# Patient Record
Sex: MALE | Race: WHITE | NOT HISPANIC OR LATINO | Employment: FULL TIME | URBAN - METROPOLITAN AREA
[De-identification: names, ages, dates, MRNs, and addresses within clinical notes are randomized per-mention and may not be internally consistent; named-entity substitution may affect disease eponyms.]

---

## 2022-07-21 ENCOUNTER — OFFICE VISIT (OUTPATIENT)
Dept: UROLOGY | Facility: CLINIC | Age: 42
End: 2022-07-21
Payer: MEDICAID

## 2022-07-21 VITALS — DIASTOLIC BLOOD PRESSURE: 80 MMHG | SYSTOLIC BLOOD PRESSURE: 165 MMHG | HEART RATE: 93 BPM | TEMPERATURE: 99 F

## 2022-07-21 DIAGNOSIS — N43.3 HYDROCELE IN ADULT: Primary | ICD-10-CM

## 2022-07-21 PROCEDURE — 99204 PR OFFICE/OUTPT VISIT, NEW, LEVL IV, 45-59 MIN: ICD-10-PCS | Mod: S$GLB,,, | Performed by: UROLOGY

## 2022-07-21 PROCEDURE — 3077F SYST BP >= 140 MM HG: CPT | Mod: CPTII,S$GLB,, | Performed by: UROLOGY

## 2022-07-21 PROCEDURE — 1160F PR REVIEW ALL MEDS BY PRESCRIBER/CLIN PHARMACIST DOCUMENTED: ICD-10-PCS | Mod: CPTII,S$GLB,, | Performed by: UROLOGY

## 2022-07-21 PROCEDURE — 3077F PR MOST RECENT SYSTOLIC BLOOD PRESSURE >= 140 MM HG: ICD-10-PCS | Mod: CPTII,S$GLB,, | Performed by: UROLOGY

## 2022-07-21 PROCEDURE — 99204 OFFICE O/P NEW MOD 45 MIN: CPT | Mod: S$GLB,,, | Performed by: UROLOGY

## 2022-07-21 PROCEDURE — 1159F MED LIST DOCD IN RCRD: CPT | Mod: CPTII,S$GLB,, | Performed by: UROLOGY

## 2022-07-21 PROCEDURE — 3079F DIAST BP 80-89 MM HG: CPT | Mod: CPTII,S$GLB,, | Performed by: UROLOGY

## 2022-07-21 PROCEDURE — 3079F PR MOST RECENT DIASTOLIC BLOOD PRESSURE 80-89 MM HG: ICD-10-PCS | Mod: CPTII,S$GLB,, | Performed by: UROLOGY

## 2022-07-21 PROCEDURE — 1159F PR MEDICATION LIST DOCUMENTED IN MEDICAL RECORD: ICD-10-PCS | Mod: CPTII,S$GLB,, | Performed by: UROLOGY

## 2022-07-21 PROCEDURE — 1160F RVW MEDS BY RX/DR IN RCRD: CPT | Mod: CPTII,S$GLB,, | Performed by: UROLOGY

## 2022-07-21 RX ORDER — MELOXICAM 15 MG/1
TABLET ORAL
COMMUNITY

## 2022-07-21 RX ORDER — TIZANIDINE 4 MG/1
TABLET ORAL
COMMUNITY
Start: 2022-07-14

## 2022-07-21 RX ORDER — MODAFINIL 200 MG/1
TABLET ORAL
COMMUNITY
Start: 2022-07-05

## 2022-07-21 RX ORDER — GABAPENTIN 300 MG/1
CAPSULE ORAL
COMMUNITY
Start: 2022-07-14

## 2022-07-21 NOTE — PROGRESS NOTES
Subjective:       Patient ID: Angel Keyes is a 41 y.o. male.    Chief Complaint: Other (Right enlarged testicle-painful)      HPI:  41-year-old male with an enlarging right scrotum he was told that it was a hydrocele he states that he has had an ultrasound however do not have an ultrasound to review he is here for management    Past Medical History:   Past Medical History:   Diagnosis Date    Coronary artery disease     Hyperlipidemia     Hypertension     Myocardial infarction     Status post partial colectomy 2018       Past Surgical Historical:   Past Surgical History:   Procedure Laterality Date    COLON SURGERY  2018        Medications:   Medication List with Changes/Refills   Current Medications    AMLODIPINE (NORVASC) 10 MG TABLET        ASPIRIN (ASPIR-LOW) 81 MG EC TABLET    Aspir-Low 81 mg tablet,delayed release   TAKE 1 TABLET ORALLY EVERY DAY THANK YOU    ATORVASTATIN (LIPITOR) 80 MG TABLET        CLOPIDOGREL (PLAVIX) 75 MG TABLET        EZETIMIBE (ZETIA) 10 MG TABLET        FLU VACC [id number] 6MOS UP,PF, (FLUZONE QUAD [id number], PF,) 60 MCG (15 MCG X 4)/0.5 ML SYRG    Fluzone Quad [id number] (PF) 60 mcg (15 mcg x 4)/0.5 mL IM syringe   flu vaccine    GABAPENTIN (NEURONTIN) 300 MG CAPSULE    TAKE 1 CAPSULE ORALLY NIGHTLY AT BEDTIME THANK YOU    MAG HYDROX/ALUMINUM HYD/SIMETH (ANTACID ORAL)    Take by mouth.    MELOXICAM (MOBIC) 15 MG TABLET    meloxicam 15 mg tablet   TAKE 1 TABLET ORALLY EVERY DAY. THANK YOU    METOPROLOL TARTRATE (LOPRESSOR) 25 MG TABLET        MODAFINIL (PROVIGIL) 200 MG TAB    TAKE 1 TABLET ORALLY EVERY MORNING THANK YOU    MULTIVIT-MIN/FOLIC/VIT K/LYCOP (MEN'S MULTIVITAMIN ORAL)    Take by mouth.    PANTOPRAZOLE (PROTONIX) 40 MG TABLET        TIZANIDINE (ZANAFLEX) 4 MG TABLET    TAKE 1 TABLET ORALLY NIGHTLY AT BEDTIME THANK YOU        Past Social History:   Social History     Socioeconomic History    Marital status:    Tobacco Use    Smoking status: Former Smoker      Types: Cigarettes     Quit date: 6/1/2019     Years since quitting: 3.1    Smokeless tobacco: Current User     Types: Snuff   Substance and Sexual Activity    Alcohol use: Not Currently    Drug use: Never   Social History Narrative    ** Merged History Encounter **            Allergies: Review of patient's allergies indicates:  No Known Allergies     Family History: History reviewed. No pertinent family history.     Review of Systems:  Review of Systems   Constitutional: Negative for activity change and appetite change.   HENT: Negative for congestion and dental problem.    Eyes: Negative for visual disturbance.   Respiratory: Negative for chest tightness and shortness of breath.    Cardiovascular: Negative for chest pain.   Gastrointestinal: Negative for abdominal distention and abdominal pain.   Genitourinary: Negative for decreased urine volume, difficulty urinating, dysuria, enuresis, flank pain, frequency, genital sores, hematuria, penile discharge, penile pain, penile swelling, scrotal swelling, testicular pain and urgency.   Musculoskeletal: Negative for back pain and neck pain.   Skin: Negative for color change.   Neurological: Negative for dizziness.   Hematological: Negative for adenopathy.   Psychiatric/Behavioral: Negative for agitation, behavioral problems and confusion.       Physical Exam:  Physical Exam  Constitutional:       General: He is not in acute distress.     Appearance: He is well-developed.   HENT:      Head: Normocephalic and atraumatic.      Nose: Nose normal.   Eyes:      General: No scleral icterus.     Conjunctiva/sclera: Conjunctivae normal.      Pupils: Pupils are equal, round, and reactive to light.   Neck:      Thyroid: No thyromegaly.      Trachea: No tracheal deviation.   Cardiovascular:      Rate and Rhythm: Normal rate and regular rhythm.      Heart sounds: Normal heart sounds.   Pulmonary:      Effort: Pulmonary effort is normal. No respiratory distress.      Breath sounds:  Normal breath sounds. No wheezing or rales.   Abdominal:      General: Bowel sounds are normal. There is no distension.      Palpations: Abdomen is soft.      Tenderness: There is no abdominal tenderness. There is no guarding or rebound.   Genitourinary:     Penis: Normal. No tenderness.       Prostate: Normal.      Comments: Patient does have a right-sided enlarged scrotum on exam it feels like a hydrocele I am not able to palpate the testicle itself due to the fluid in the scrotum  Musculoskeletal:         General: No deformity. Normal range of motion.      Cervical back: Neck supple.   Lymphadenopathy:      Cervical: No cervical adenopathy.   Skin:     General: Skin is warm and dry.      Findings: No erythema or rash.   Neurological:      Mental Status: He is alert and oriented to person, place, and time.      Cranial Nerves: No cranial nerve deficit.   Psychiatric:         Behavior: Behavior normal.         Assessment/Plan:       Problem List Items Addressed This Visit    None     Visit Diagnoses     Hydrocele in adult    -  Primary              suspected right-sided hydrocele patient states that he had an ultrasound we will need to get these images if he does not have an ultrasound available we will need to repeat ultrasound and we will likely then proceed with  a right-sided hydrocelectomy

## 2022-07-27 ENCOUNTER — TELEPHONE (OUTPATIENT)
Dept: UROLOGY | Facility: CLINIC | Age: 42
End: 2022-07-27
Payer: MEDICAID

## 2022-07-27 DIAGNOSIS — N43.3 HYDROCELE IN ADULT: Primary | ICD-10-CM

## 2022-07-27 DIAGNOSIS — N43.3 RIGHT HYDROCELE: ICD-10-CM

## 2022-07-27 NOTE — TELEPHONE ENCOUNTER
----- Message from Rober Guillen MD sent at 7/27/2022  2:30 PM CDT -----  Please set up for r-hydrocelectomy

## 2022-08-01 ENCOUNTER — CLINICAL SUPPORT (OUTPATIENT)
Dept: UROLOGY | Facility: CLINIC | Age: 42
End: 2022-08-01
Payer: MEDICAID

## 2022-08-01 DIAGNOSIS — N43.3 HYDROCELE IN ADULT: Primary | ICD-10-CM

## 2022-08-01 LAB
ANION GAP SERPL CALC-SCNC: 5 MMOL/L (ref 3–11)
APPEARANCE, UA: ABNORMAL
BASOPHILS NFR BLD: 0.7 % (ref 0–3)
BILIRUB UR QL STRIP: NEGATIVE MG/DL
BUN SERPL-MCNC: 15 MG/DL (ref 7–18)
BUN/CREAT SERPL: 18.98 RATIO (ref 7–18)
CALCIUM SERPL-MCNC: 9.4 MG/DL (ref 8.8–10.5)
CHLORIDE SERPL-SCNC: 104 MMOL/L (ref 100–108)
CO2 SERPL-SCNC: 30 MMOL/L (ref 21–32)
COLOR UR: ABNORMAL
CREAT SERPL-MCNC: 0.79 MG/DL (ref 0.7–1.3)
EOSINOPHIL NFR BLD: 1.8 % (ref 1–3)
ERYTHROCYTE [DISTWIDTH] IN BLOOD BY AUTOMATED COUNT: 13.6 % (ref 12.5–18)
GFR ESTIMATION: > 60
GLUCOSE (UA): NORMAL MG/DL
GLUCOSE SERPL-MCNC: 111 MG/DL (ref 70–110)
HCT VFR BLD AUTO: 43.6 % (ref 42–52)
HGB BLD-MCNC: 14.7 G/DL (ref 14–18)
HGB UR QL STRIP: NEGATIVE /UL
KETONES UR QL STRIP: NEGATIVE MG/DL
LEUKOCYTE ESTERASE UR QL STRIP: NEGATIVE /UL
LYMPHOCYTES NFR BLD: 21.6 % (ref 25–40)
MCH RBC QN AUTO: 28.3 PG (ref 27–31.2)
MCHC RBC AUTO-ENTMCNC: 33.7 G/DL (ref 31.8–35.4)
MCV RBC AUTO: 84 FL (ref 80–97)
MONOCYTES NFR BLD: 7 % (ref 1–15)
NEUTROPHILS # BLD AUTO: 6.74 10*3/UL (ref 1.8–7.7)
NEUTROPHILS NFR BLD: 67.9 % (ref 37–80)
NITRITE UR QL STRIP: NEGATIVE
NUCLEATED RED BLOOD CELLS: 0 %
PH UR STRIP: 8 PH (ref 5–9)
PLATELETS: 326 10*3/UL (ref 142–424)
POTASSIUM SERPL-SCNC: 3.8 MMOL/L (ref 3.6–5.2)
PROT UR QL STRIP: ABNORMAL MG/DL
RBC # BLD AUTO: 5.19 10*6/UL (ref 4.7–6.1)
SODIUM BLD-SCNC: 139 MMOL/L (ref 135–145)
SP GR UR STRIP: 1.02 (ref 1–1.03)
SPECIMEN COLLECTION METHOD, URINE: ABNORMAL
UROBILINOGEN UR STRIP-ACNC: NORMAL MG/DL
WBC # BLD: 9.9 10*3/UL (ref 4.6–10.2)

## 2022-08-01 NOTE — PROGRESS NOTES
Pre-op instructions and surgery consent HYDROCELECTOMY reviewed with pt. Pt verbalized understanding. Surgery consent signed by pt.

## 2022-08-09 ENCOUNTER — TELEPHONE (OUTPATIENT)
Dept: UROLOGY | Facility: CLINIC | Age: 42
End: 2022-08-09
Payer: MEDICAID

## 2022-08-09 DIAGNOSIS — N43.3 HYDROCELE IN ADULT: Primary | ICD-10-CM

## 2022-08-09 DIAGNOSIS — N43.3 RIGHT HYDROCELE: ICD-10-CM

## 2022-08-09 RX ORDER — CIPROFLOXACIN 500 MG/1
500 TABLET ORAL 2 TIMES DAILY
Qty: 6 TABLET | Refills: 0 | Status: CANCELLED | OUTPATIENT
Start: 2022-08-09

## 2022-08-09 RX ORDER — HYDROCODONE BITARTRATE AND ACETAMINOPHEN 10; 325 MG/1; MG/1
1 TABLET ORAL EVERY 6 HOURS PRN
Qty: 10 TABLET | Refills: 0 | Status: CANCELLED | OUTPATIENT
Start: 2022-08-09

## 2022-08-09 NOTE — TELEPHONE ENCOUNTER
----- Message from Parisa Livingston sent at 8/9/2022 11:39 AM CDT -----  Patient need to speak with nurse regarding scheduled surgery. Call back number [phone] (home)

## 2022-09-07 ENCOUNTER — TELEPHONE (OUTPATIENT)
Dept: UROLOGY | Facility: CLINIC | Age: 42
End: 2022-09-07
Payer: MEDICAID

## 2022-09-07 NOTE — TELEPHONE ENCOUNTER
Callback to spouse, she's wanting to know if pt can proceed with procedure. Pt has had screen test. Advised pt to have results faxed to office, and as soon as we get everything and clearance we will proceed to scheduling pt. Spouse verbalized understanding. BJP      ----- Message from Opal Rucker sent at 9/7/2022  9:28 AM CDT -----  Contact: pt wife  Pt wife calling about test results and info on new appt.  They can be reached at [phone] or [phone]    Thanks,

## 2022-09-15 DIAGNOSIS — N43.3 HYDROCELE IN ADULT: Primary | ICD-10-CM

## 2022-09-19 ENCOUNTER — CLINICAL SUPPORT (OUTPATIENT)
Dept: UROLOGY | Facility: CLINIC | Age: 42
End: 2022-09-19
Payer: MEDICAID

## 2022-09-19 NOTE — PROGRESS NOTES
Pt here for R Hydrocelectomy education.  All pre-op information reviewed and questions answered.  Pt acknowledges understanding.  Consents signed.

## 2022-09-20 ENCOUNTER — TELEPHONE (OUTPATIENT)
Dept: UROLOGY | Facility: CLINIC | Age: 42
End: 2022-09-20
Payer: MEDICAID

## 2022-09-20 NOTE — TELEPHONE ENCOUNTER
Spoke with wife and informed her that pt is having R-Hydrocelectomy 9/28/22. Yelena will inform hospital.    ----- Message from Grace Gardner sent at 9/20/2022  3:14 PM CDT -----  Angel Keyes is calling to get the date of his upcoming procedure, please give him a call back at [phone]

## 2022-09-21 LAB
ANION GAP SERPL CALC-SCNC: 4 MMOL/L (ref 3–11)
APPEARANCE, UA: CLEAR
BASOPHILS NFR BLD: 0.5 % (ref 0–3)
BILIRUB UR QL STRIP: NEGATIVE MG/DL
BUN SERPL-MCNC: 16 MG/DL (ref 7–18)
BUN/CREAT SERPL: 16.49 RATIO (ref 7–18)
CALCIUM SERPL-MCNC: 9 MG/DL (ref 8.8–10.5)
CHLORIDE SERPL-SCNC: 102 MMOL/L (ref 100–108)
CO2 SERPL-SCNC: 32 MMOL/L (ref 21–32)
COLOR UR: ABNORMAL
CREAT SERPL-MCNC: 0.97 MG/DL (ref 0.7–1.3)
EOSINOPHIL NFR BLD: 2.7 % (ref 1–3)
ERYTHROCYTE [DISTWIDTH] IN BLOOD BY AUTOMATED COUNT: 13.7 % (ref 12.5–18)
GFR ESTIMATION: > 60
GLUCOSE (UA): NORMAL MG/DL
GLUCOSE SERPL-MCNC: 143 MG/DL (ref 70–110)
HCT VFR BLD AUTO: 45.2 % (ref 42–52)
HGB BLD-MCNC: 14.8 G/DL (ref 14–18)
HGB UR QL STRIP: NEGATIVE /UL
KETONES UR QL STRIP: NEGATIVE MG/DL
LEUKOCYTE ESTERASE UR QL STRIP: NEGATIVE /UL
LYMPHOCYTES NFR BLD: 21.8 % (ref 25–40)
MCH RBC QN AUTO: 28.4 PG (ref 27–31.2)
MCHC RBC AUTO-ENTMCNC: 32.7 G/DL (ref 31.8–35.4)
MCV RBC AUTO: 86.6 FL (ref 80–97)
MONOCYTES NFR BLD: 6.8 % (ref 1–15)
NEUTROPHILS # BLD AUTO: 6.27 10*3/UL (ref 1.8–7.7)
NEUTROPHILS NFR BLD: 67.1 % (ref 37–80)
NITRITE UR QL STRIP: NEGATIVE
NUCLEATED RED BLOOD CELLS: 0 %
PH UR STRIP: 6.5 PH (ref 5–9)
PLATELETS: 277 10*3/UL (ref 142–424)
POTASSIUM SERPL-SCNC: 4.1 MMOL/L (ref 3.6–5.2)
PROT UR QL STRIP: ABNORMAL MG/DL
RBC # BLD AUTO: 5.22 10*6/UL (ref 4.7–6.1)
SODIUM BLD-SCNC: 138 MMOL/L (ref 135–145)
SP GR UR STRIP: 1.02 (ref 1–1.03)
SPECIMEN COLLECTION METHOD, URINE: ABNORMAL
UROBILINOGEN UR STRIP-ACNC: NORMAL MG/DL
WBC # BLD: 9.4 10*3/UL (ref 4.6–10.2)

## 2022-09-28 ENCOUNTER — OUTSIDE PLACE OF SERVICE (OUTPATIENT)
Dept: UROLOGY | Facility: CLINIC | Age: 42
End: 2022-09-28

## 2022-09-28 PROCEDURE — 55040 REMOVAL OF HYDROCELE: CPT | Mod: RT,,, | Performed by: UROLOGY

## 2022-09-28 PROCEDURE — 55040 PR REMOVAL OF HYDROCELE,TUNICA,UNILAT: ICD-10-PCS | Mod: RT,,, | Performed by: UROLOGY

## 2022-09-28 RX ORDER — SULFAMETHOXAZOLE AND TRIMETHOPRIM 800; 160 MG/1; MG/1
1 TABLET ORAL 2 TIMES DAILY
Qty: 6 TABLET | Refills: 0 | Status: SHIPPED | OUTPATIENT
Start: 2022-09-28 | End: 2022-10-01

## 2022-09-28 RX ORDER — HYDROCODONE BITARTRATE AND ACETAMINOPHEN 10; 325 MG/1; MG/1
1 TABLET ORAL EVERY 6 HOURS PRN
Qty: 12 TABLET | Refills: 0 | Status: SHIPPED | OUTPATIENT
Start: 2022-09-28 | End: 2023-01-20

## 2022-10-25 ENCOUNTER — OFFICE VISIT (OUTPATIENT)
Dept: UROLOGY | Facility: CLINIC | Age: 42
End: 2022-10-25
Payer: MEDICAID

## 2022-10-25 DIAGNOSIS — N43.3 HYDROCELE, UNSPECIFIED HYDROCELE TYPE: Primary | ICD-10-CM

## 2022-10-25 PROCEDURE — 1159F PR MEDICATION LIST DOCUMENTED IN MEDICAL RECORD: ICD-10-PCS | Mod: CPTII,S$GLB,, | Performed by: UROLOGY

## 2022-10-25 PROCEDURE — 99024 POSTOP FOLLOW-UP VISIT: CPT | Mod: S$GLB,,, | Performed by: UROLOGY

## 2022-10-25 PROCEDURE — 1159F MED LIST DOCD IN RCRD: CPT | Mod: CPTII,S$GLB,, | Performed by: UROLOGY

## 2022-10-25 PROCEDURE — 99024 PR POST-OP FOLLOW-UP VISIT: ICD-10-PCS | Mod: S$GLB,,, | Performed by: UROLOGY

## 2022-10-25 PROCEDURE — 1160F RVW MEDS BY RX/DR IN RCRD: CPT | Mod: CPTII,S$GLB,, | Performed by: UROLOGY

## 2022-10-25 PROCEDURE — 1160F PR REVIEW ALL MEDS BY PRESCRIBER/CLIN PHARMACIST DOCUMENTED: ICD-10-PCS | Mod: CPTII,S$GLB,, | Performed by: UROLOGY

## 2022-10-25 NOTE — PROGRESS NOTES
Subjective:       Patient ID: Angel Keyes is a 41 y.o. male.    Chief Complaint: No chief complaint on file.      HPI:  41-year-old male status post hydrocelectomy patient is doing well he reports the wound is healed    Past Medical History:   Past Medical History:   Diagnosis Date    Coronary artery disease     Hyperlipidemia     Hypertension     Myocardial infarction     Status post partial colectomy 2018       Past Surgical Historical:   Past Surgical History:   Procedure Laterality Date    COLON SURGERY  2018        Medications:   Medication List with Changes/Refills   Current Medications    AMLODIPINE (NORVASC) 10 MG TABLET        ASPIRIN (ECOTRIN) 81 MG EC TABLET    Aspir-Low 81 mg tablet,delayed release   TAKE 1 TABLET ORALLY EVERY DAY THANK YOU    ATORVASTATIN (LIPITOR) 80 MG TABLET        AZELASTINE (ASTELIN) 137 MCG (0.1 %) NASAL SPRAY    azelastine 137 mcg (0.1 %) nasal spray aerosol   INSTILL 1 SPRAY IN EACH NOSTRIL TWICE DAILY. THANK YOU    CLOPIDOGREL (PLAVIX) 75 MG TABLET        EZETIMIBE (ZETIA) 10 MG TABLET        FLU VACC [id number] 6MOS UP,PF, 60 MCG (15 MCG X 4)/0.5 ML SYRG    Fluzone Quad [id number] (PF) 60 mcg (15 mcg x 4)/0.5 mL IM syringe   flu vaccine    FLUTICASONE PROPIONATE (FLONASE) 50 MCG/ACTUATION NASAL SPRAY    fluticasone propionate 50 mcg/actuation nasal spray,suspension   INSTILL 2 SPRAYS IN EACH NOSTRIL ONCE DAILY. THANK YOU    GABAPENTIN (NEURONTIN) 300 MG CAPSULE    TAKE 1 CAPSULE ORALLY NIGHTLY AT BEDTIME THANK YOU    HYDROCODONE-ACETAMINOPHEN (NORCO)  MG PER TABLET    Take 1 tablet by mouth every 6 (six) hours as needed for Pain.    MAG HYDROX/ALUMINUM HYD/SIMETH (ANTACID ORAL)    Take by mouth.    MELOXICAM (MOBIC) 15 MG TABLET    meloxicam 15 mg tablet   TAKE 1 TABLET ORALLY EVERY DAY. THANK YOU    METOPROLOL TARTRATE (LOPRESSOR) 25 MG TABLET        MODAFINIL (PROVIGIL) 200 MG TAB    TAKE 1 TABLET ORALLY EVERY MORNING THANK YOU    MULTIVIT-MIN/FOLIC/VIT K/LYCOP  (MEN'S MULTIVITAMIN ORAL)    Take by mouth.    PANTOPRAZOLE (PROTONIX) 40 MG TABLET        TIZANIDINE (ZANAFLEX) 4 MG TABLET    TAKE 1 TABLET ORALLY NIGHTLY AT BEDTIME THANK YOU        Past Social History:   Social History     Socioeconomic History    Marital status:    Tobacco Use    Smoking status: Former     Types: Cigarettes     Quit date: 6/1/2019     Years since quitting: 3.4    Smokeless tobacco: Current     Types: Snuff   Substance and Sexual Activity    Alcohol use: Not Currently    Drug use: Never   Social History Narrative    ** Merged History Encounter **            Allergies: Review of patient's allergies indicates:  No Known Allergies     Family History: History reviewed. No pertinent family history.     Review of Systems:  Review of Systems   Constitutional:  Negative for activity change and appetite change.   HENT:  Negative for congestion and dental problem.    Eyes:  Negative for visual disturbance.   Respiratory:  Negative for chest tightness and shortness of breath.    Cardiovascular:  Negative for chest pain.   Gastrointestinal:  Negative for abdominal distention and abdominal pain.   Endocrine: Negative for polyuria.   Genitourinary:  Negative for difficulty urinating, dysuria, flank pain, frequency, hematuria, penile discharge, penile pain, penile swelling, scrotal swelling, testicular pain and urgency.   Musculoskeletal:  Negative for back pain and neck pain.   Skin:  Negative for color change.   Allergic/Immunologic: Positive for immunocompromised state.   Neurological:  Negative for dizziness.   Hematological:  Negative for adenopathy.   Psychiatric/Behavioral:  Negative for agitation, behavioral problems and confusion.      Physical Exam:  Physical Exam  Constitutional:       General: He is not in acute distress.     Appearance: He is well-developed.   HENT:      Head: Normocephalic and atraumatic.      Nose: Nose normal.   Eyes:      General: No scleral icterus.      Conjunctiva/sclera: Conjunctivae normal.      Pupils: Pupils are equal, round, and reactive to light.   Neck:      Thyroid: No thyromegaly.      Trachea: No tracheal deviation.   Cardiovascular:      Rate and Rhythm: Normal rate and regular rhythm.      Heart sounds: Normal heart sounds.   Pulmonary:      Effort: Pulmonary effort is normal. No respiratory distress.      Breath sounds: Normal breath sounds. No wheezing or rales.   Abdominal:      General: Bowel sounds are normal. There is no distension.      Palpations: Abdomen is soft.      Tenderness: There is no abdominal tenderness. There is no guarding or rebound.   Genitourinary:     Penis: Normal. No tenderness.       Prostate: Normal.   Musculoskeletal:         General: No deformity. Normal range of motion.      Cervical back: Neck supple.   Lymphadenopathy:      Cervical: No cervical adenopathy.   Skin:     General: Skin is warm and dry.      Findings: No erythema or rash.   Neurological:      Mental Status: He is alert and oriented to person, place, and time.      Cranial Nerves: No cranial nerve deficit.   Psychiatric:         Behavior: Behavior normal.       Assessment/Plan:       Problem List Items Addressed This Visit    None  Visit Diagnoses       Hydrocele, unspecified hydrocele type    -  Primary               Says post hydrocelectomy:   Area is healed up nicely return to clinic as needed

## 2023-06-30 PROBLEM — G56.03 CARPAL TUNNEL SYNDROME, BILATERAL: Status: ACTIVE | Noted: 2023-06-30
